# Patient Record
Sex: MALE | Race: BLACK OR AFRICAN AMERICAN | NOT HISPANIC OR LATINO | Employment: FULL TIME | URBAN - METROPOLITAN AREA
[De-identification: names, ages, dates, MRNs, and addresses within clinical notes are randomized per-mention and may not be internally consistent; named-entity substitution may affect disease eponyms.]

---

## 2021-08-18 ENCOUNTER — HOSPITAL ENCOUNTER (EMERGENCY)
Facility: HOSPITAL | Age: 31
Discharge: HOME | End: 2021-08-18
Attending: STUDENT IN AN ORGANIZED HEALTH CARE EDUCATION/TRAINING PROGRAM
Payer: MEDICAID

## 2021-08-18 VITALS
BODY MASS INDEX: 24.89 KG/M2 | RESPIRATION RATE: 17 BRPM | WEIGHT: 187.81 LBS | OXYGEN SATURATION: 100 % | DIASTOLIC BLOOD PRESSURE: 82 MMHG | HEIGHT: 73 IN | SYSTOLIC BLOOD PRESSURE: 118 MMHG | TEMPERATURE: 98 F | HEART RATE: 97 BPM

## 2021-08-18 DIAGNOSIS — M54.9 BACK PAIN, UNSPECIFIED BACK LOCATION, UNSPECIFIED BACK PAIN LATERALITY, UNSPECIFIED CHRONICITY: ICD-10-CM
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
## 2021-08-18 PROCEDURE — 99284 EMERGENCY DEPT VISIT MOD MDM: CPT | Mod: ER

## 2021-08-18 RX ORDER — IBUPROFEN 800 MG/1
800 TABLET ORAL EVERY 6 HOURS PRN
Qty: 20 TABLET | Refills: 0 | OUTPATIENT
Start: 2021-08-18 | End: 2022-06-20

## 2021-08-18 RX ORDER — METHOCARBAMOL 500 MG/1
1000 TABLET, FILM COATED ORAL 3 TIMES DAILY
Qty: 30 TABLET | Refills: 0 | Status: SHIPPED | OUTPATIENT
Start: 2021-08-18 | End: 2021-08-23

## 2022-06-20 ENCOUNTER — HOSPITAL ENCOUNTER (EMERGENCY)
Facility: HOSPITAL | Age: 32
Discharge: HOME | End: 2022-06-20
Attending: STUDENT IN AN ORGANIZED HEALTH CARE EDUCATION/TRAINING PROGRAM
Payer: MEDICAID

## 2022-06-20 VITALS
OXYGEN SATURATION: 98 % | TEMPERATURE: 98 F | RESPIRATION RATE: 16 BRPM | SYSTOLIC BLOOD PRESSURE: 156 MMHG | BODY MASS INDEX: 25.41 KG/M2 | DIASTOLIC BLOOD PRESSURE: 66 MMHG | HEIGHT: 74 IN | HEART RATE: 79 BPM | WEIGHT: 198 LBS

## 2022-06-20 DIAGNOSIS — S92.001A CLOSED NONDISPLACED FRACTURE OF RIGHT CALCANEUS, UNSPECIFIED PORTION OF CALCANEUS, INITIAL ENCOUNTER: Primary | ICD-10-CM

## 2022-06-20 PROCEDURE — 96372 THER/PROPH/DIAG INJ SC/IM: CPT | Mod: 59 | Performed by: PHYSICIAN ASSISTANT

## 2022-06-20 PROCEDURE — 25000003 PHARM REV CODE 250: Performed by: PHYSICIAN ASSISTANT

## 2022-06-20 PROCEDURE — 63600175 PHARM REV CODE 636 W HCPCS: Performed by: PHYSICIAN ASSISTANT

## 2022-06-20 PROCEDURE — 29515 APPLICATION SHORT LEG SPLINT: CPT | Mod: RT

## 2022-06-20 PROCEDURE — 99285 EMERGENCY DEPT VISIT HI MDM: CPT | Mod: 25

## 2022-06-20 RX ORDER — IBUPROFEN 800 MG/1
800 TABLET ORAL EVERY 8 HOURS PRN
Qty: 30 TABLET | Refills: 0 | Status: SHIPPED | OUTPATIENT
Start: 2022-06-20 | End: 2022-07-28 | Stop reason: SDUPTHER

## 2022-06-20 RX ORDER — NAPROXEN 500 MG/1
500 TABLET ORAL
Status: COMPLETED | OUTPATIENT
Start: 2022-06-20 | End: 2022-06-20

## 2022-06-20 RX ORDER — HYDROMORPHONE HYDROCHLORIDE 2 MG/ML
1 INJECTION, SOLUTION INTRAMUSCULAR; INTRAVENOUS; SUBCUTANEOUS
Status: COMPLETED | OUTPATIENT
Start: 2022-06-20 | End: 2022-06-20

## 2022-06-20 RX ORDER — OXYCODONE AND ACETAMINOPHEN 5; 325 MG/1; MG/1
1 TABLET ORAL EVERY 4 HOURS PRN
Qty: 18 TABLET | Refills: 0 | Status: SHIPPED | OUTPATIENT
Start: 2022-06-20

## 2022-06-20 RX ADMIN — HYDROMORPHONE HYDROCHLORIDE 1 MG: 2 INJECTION INTRAMUSCULAR; INTRAVENOUS; SUBCUTANEOUS at 04:06

## 2022-06-20 RX ADMIN — NAPROXEN 500 MG: 500 TABLET ORAL at 04:06

## 2022-06-20 NOTE — Clinical Note
"Luigi MONTES"Joceline Rodriguez was seen and treated in our emergency department on 6/20/2022.  He may return to work on 06/27/2022.       If you have any questions or concerns, please don't hesitate to call.      Frank Arita PA-C"

## 2022-06-20 NOTE — ED PROVIDER NOTES
Encounter Date: 6/20/2022       History     Chief Complaint   Patient presents with    Ankle Pain     Right ankle pain with edema and deformity. Pt reports he jumped down the stairs.      Chief Complaint: Ankle pain  History of  Present Illness: History obtained from patient. This 31 y.o. male who has no known past medical history presents to the ED complaining of right ankle pain after jumping down stairs prior to arrival.  Patient has been unable to bear weight on the right lower extremity since the incident.  Denies numbness, tingling, weakness.  No prior treatment for symptoms.  Pain is 10/10.        Review of patient's allergies indicates:   Allergen Reactions    Tomato Rash     No past medical history on file.  No past surgical history on file.  No family history on file.  Social History     Tobacco Use    Smoking status: Unknown If Ever Smoked     Review of Systems   Constitutional: Negative for chills and fever.   HENT: Negative for congestion, rhinorrhea and sore throat.    Eyes: Negative for visual disturbance.   Respiratory: Negative for cough and shortness of breath.    Cardiovascular: Negative for chest pain.   Gastrointestinal: Negative for abdominal pain, diarrhea, nausea and vomiting.   Genitourinary: Negative for dysuria, frequency and hematuria.   Musculoskeletal: Positive for arthralgias. Negative for back pain.   Skin: Negative for rash.   Neurological: Negative for dizziness, weakness, numbness and headaches.       Physical Exam     Initial Vitals [06/20/22 0140]   BP Pulse Resp Temp SpO2   (!) 156/66 79 16 98 °F (36.7 °C) 98 %      MAP       --         Physical Exam    Nursing note and vitals reviewed.  Constitutional: He appears well-developed and well-nourished. No distress.   HENT:   Head: Normocephalic and atraumatic.   Right Ear: Tympanic membrane normal.   Left Ear: Tympanic membrane normal.   Nose: Nose normal.   Mouth/Throat: Uvula is midline, oropharynx is clear and moist and mucous  membranes are normal.   Eyes: EOM are normal. Pupils are equal, round, and reactive to light.   Neck: Trachea normal and phonation normal. Neck supple. No stridor present.   Normal range of motion.   Full passive range of motion without pain.     Cardiovascular: Normal rate, regular rhythm and normal heart sounds. Exam reveals no gallop and no friction rub.    No murmur heard.  Pulmonary/Chest: Effort normal and breath sounds normal. No respiratory distress. He has no wheezes. He has no rhonchi. He has no rales.   Abdominal: Abdomen is soft. Bowel sounds are normal. He exhibits no mass. There is no abdominal tenderness. There is no rebound and no guarding.   Musculoskeletal:      Cervical back: Full passive range of motion without pain, normal range of motion and neck supple. No rigidity. No spinous process tenderness or muscular tenderness. Normal range of motion.      Right ankle: Swelling present. No deformity, ecchymosis or lacerations. Tenderness present over the lateral malleolus. No medial malleolus or proximal fibula tenderness. Decreased range of motion.      Right Achilles Tendon: Normal. No tenderness or defects. Pickett's test negative.     Neurological: He is alert and oriented to person, place, and time. He has normal strength. No cranial nerve deficit or sensory deficit.   Skin: Skin is warm and dry. Capillary refill takes less than 2 seconds.   Psychiatric: He has a normal mood and affect.         ED Course   Splint Application    Date/Time: 6/20/2022 5:37 AM  Performed by: Frank Arita PA-C  Authorized by: Lor Rios DO   Location details: right ankle  Splint type: short leg (with stirrups)  Supplies used: Ortho-Glass  Post-procedure: The splinted body part was neurovascularly unchanged following the procedure.  Patient tolerance: Patient tolerated the procedure well with no immediate complications        Labs Reviewed - No data to display       Imaging Results          CT Ankle  (Including Hindfoot) Without Contrast Right (In process)                 X-Ray Ankle Complete Right (Final result)  Result time 06/20/22 02:24:32    Final result by Maximo Sosa MD (06/20/22 02:24:32)                 Impression:      No acute fracture or dislocation of the ankle.  Soft tissue swelling over the lateral malleolus.    Comminuted nondisplaced calcaneal fracture with subtalar extension resulting in flattening of Boehler's angle.    This report was flagged in Epic as abnormal.      Electronically signed by: Maximo Sosa MD  Date:    06/20/2022  Time:    02:24             Narrative:    EXAMINATION:  XR ANKLE COMPLETE 3 VIEW RIGHT    CLINICAL HISTORY:  Edema, unspecified    TECHNIQUE:  AP, lateral, and oblique images of the right ankle were performed.    COMPARISON:  None    FINDINGS:  No acute fracture or dislocation of the ankle.  Comminuted nondisplaced calcaneal fracture with subtalar extension resulting in flattening of Boehler's angle.  Ankle mortise is symmetric.  Talar dome is maintained.  Soft tissue swelling over the lateral ankle.                                 Medications   naproxen tablet 500 mg (500 mg Oral Given 6/20/22 0402)   HYDROmorphone (PF) injection 1 mg (1 mg Intramuscular Given 6/20/22 0402)     Medical Decision Making:   ED Management:  This is an evaluation of a 31 y.o. male who presents to the ED for right ankle pain status post fall after jump from stairs.  Vital signs are stable.   Afebrile.  Patient is nontoxic appearing and in no acute distress.  There is tenderness palpation to the lateral malleolus of the right ankle.  There is significant edema to the ankle.  No open wounds.  No obvious deformities.  Neurovascularly intact.  All compartments are soft.    X-ray of the right ankle shows comminuted nondisplaced calcaneal fracture with subtalar extension resulting in flattening of Boehler's angle.    Given abnormal x-ray, Orthopedics was consulted.  I discussed case  with Dr. Shavonne Mondragon recommended CT to further evaluate the fracture.  She stated the patient would be stable for discharge home.  She recommended posterior ankle splint with stirrups.    Patient given return precautions and instructed to return to the emergency department for any new or worsening symptoms. Patient verbalized understanding and agreed with plan. Encouraged follow-up with PCP.                        Clinical Impression:   Final diagnoses:  [S92.001A] Closed nondisplaced fracture of right calcaneus, unspecified portion of calcaneus, initial encounter (Primary)          ED Disposition Condition    Discharge Stable        ED Prescriptions     Medication Sig Dispense Start Date End Date Auth. Provider    oxyCODONE-acetaminophen (PERCOCET) 5-325 mg per tablet Take 1 tablet by mouth every 4 (four) hours as needed for Pain. 18 tablet 6/20/2022  Frank Arita PA-C    ibuprofen (ADVIL,MOTRIN) 800 MG tablet Take 1 tablet (800 mg total) by mouth every 8 (eight) hours as needed for Pain. 30 tablet 6/20/2022  Frank Arita PA-C        Follow-up Information     Follow up With Specialties Details Why Contact Info    Shavonne Mondragon MD Orthopedic Surgery   [address]  [address]  [facility]  [facility] [address]  [phone]      [facility] [place] Emergency Medicine Go in 1 day If symptoms worsen [address] [facility] [id number] [phone]           Frank Arita PA-C  06/20/22 0517       Frank Arita PA-C  06/20/22 0537

## 2022-07-08 ENCOUNTER — TELEPHONE (OUTPATIENT)
Dept: ORTHOPEDICS | Facility: CLINIC | Age: 32
End: 2022-07-08
Payer: MEDICAID

## 2022-07-08 NOTE — TELEPHONE ENCOUNTER
----- Message from Carmen Castro sent at 7/8/2022  9:29 AM CDT -----  Regarding: Appt  Contact: Cyndi @ [phone]  CallerCyndi (Mom) is calling to speak to someone to at the [place] location to schedule pt for an appt soon. Pt has a referral in Epic. No appts in Epic, Caller is asking to have pt scheduled at the [place] location. Pt's dx: Heel pain, unspecified laterality [M79.673], Right foot pain [M79.671. Please call Cyndi @ [phone] or pt [phone].

## 2022-07-08 NOTE — TELEPHONE ENCOUNTER
Returned call to inform that no one is available at [place] to treat pt. Pt prefers [facility] closer to home. [facility] ED f/u note reads to f/u with Dr Mondragon. Address and phone number given to Dr Shavonne Mondragon's office.

## 2022-07-22 ENCOUNTER — OFFICE VISIT (OUTPATIENT)
Dept: PODIATRY | Facility: CLINIC | Age: 32
End: 2022-07-22
Payer: MEDICAID

## 2022-07-22 VITALS — BODY MASS INDEX: 24.73 KG/M2 | HEIGHT: 74 IN | WEIGHT: 192.69 LBS

## 2022-07-22 DIAGNOSIS — S92.001A CLOSED NONDISPLACED FRACTURE OF RIGHT CALCANEUS, UNSPECIFIED PORTION OF CALCANEUS, INITIAL ENCOUNTER: Primary | ICD-10-CM

## 2022-07-22 DIAGNOSIS — M79.671 PAIN OF RIGHT HEEL: ICD-10-CM

## 2022-07-22 PROCEDURE — 1160F RVW MEDS BY RX/DR IN RCRD: CPT | Mod: CPTII,,, | Performed by: PODIATRIST

## 2022-07-22 PROCEDURE — 99999 PR PBB SHADOW E&M-EST. PATIENT-LVL III: CPT | Mod: PBBFAC,,, | Performed by: PODIATRIST

## 2022-07-22 PROCEDURE — 99204 PR OFFICE/OUTPT VISIT, NEW, LEVL IV, 45-59 MIN: ICD-10-PCS | Mod: S$PBB,,, | Performed by: PODIATRIST

## 2022-07-22 PROCEDURE — 1159F MED LIST DOCD IN RCRD: CPT | Mod: CPTII,,, | Performed by: PODIATRIST

## 2022-07-22 PROCEDURE — 3008F BODY MASS INDEX DOCD: CPT | Mod: CPTII,,, | Performed by: PODIATRIST

## 2022-07-22 PROCEDURE — 1159F PR MEDICATION LIST DOCUMENTED IN MEDICAL RECORD: ICD-10-PCS | Mod: CPTII,,, | Performed by: PODIATRIST

## 2022-07-22 PROCEDURE — 3008F PR BODY MASS INDEX (BMI) DOCUMENTED: ICD-10-PCS | Mod: CPTII,,, | Performed by: PODIATRIST

## 2022-07-22 PROCEDURE — 99999 PR PBB SHADOW E&M-EST. PATIENT-LVL III: ICD-10-PCS | Mod: PBBFAC,,, | Performed by: PODIATRIST

## 2022-07-22 PROCEDURE — 1160F PR REVIEW ALL MEDS BY PRESCRIBER/CLIN PHARMACIST DOCUMENTED: ICD-10-PCS | Mod: CPTII,,, | Performed by: PODIATRIST

## 2022-07-22 PROCEDURE — 99204 OFFICE O/P NEW MOD 45 MIN: CPT | Mod: S$PBB,,, | Performed by: PODIATRIST

## 2022-07-22 PROCEDURE — 99213 OFFICE O/P EST LOW 20 MIN: CPT | Mod: PBBFAC,PN | Performed by: PODIATRIST

## 2022-07-22 NOTE — PROGRESS NOTES
Subjective:      Patient ID: Luigi Rodriguez is a 31 y.o. male.    Chief Complaint: Foot Injury (Patient presents today as  a new patient complaining he fractured his heel about 1 month ago. )      31 y.o. male presenting with right heel pain. Points hindfoot/calc area for pain. Aching and throbbing pain. Injury was on 6/20. Went to ED. Xrays and CT noted non/minimally displaced calc fracture. Was splinted and discharged from ED. Pain and swelling slowly improving. With his family member today. Has been walking on PS. Not presenting with crutches today. Denies smoking cigarettes.    Review of Systems   Constitutional: Negative for chills, decreased appetite, fever and malaise/fatigue.   HENT: Negative for congestion, ear discharge and sore throat.    Eyes: Negative for discharge and pain.   Cardiovascular: Negative for chest pain, claudication and leg swelling.   Respiratory: Negative for cough and shortness of breath.    Skin: Negative for color change, nail changes and rash.   Musculoskeletal: Positive for joint swelling and stiffness. Negative for arthritis, joint pain and muscle weakness.        R foot pain    Gastrointestinal: Negative for bloating, abdominal pain, diarrhea, nausea and vomiting.   Genitourinary: Negative for flank pain and hematuria.   Neurological: Negative for headaches, numbness and weakness.   Psychiatric/Behavioral: Negative for altered mental status.             No past medical history on file.    No past surgical history on file.    No family history on file.    Social History     Socioeconomic History    Marital status: Single   Tobacco Use    Smoking status: Former Smoker    Smokeless tobacco: Never Used       Current Outpatient Medications   Medication Sig Dispense Refill    cyclobenzaprine (FLEXERIL) 10 MG tablet Take 1 tablet (10 mg total) by mouth every evening. 30 tablet 0    etodolac (LODINE) 400 MG tablet Take 1 tablet (400 mg total) by mouth 2 (two) times daily. 30 tablet 0  "   ibuprofen (ADVIL,MOTRIN) 800 MG tablet Take 1 tablet (800 mg total) by mouth every 8 (eight) hours as needed for Pain. 30 tablet 0    oxyCODONE-acetaminophen (PERCOCET) 5-325 mg per tablet Take 1 tablet by mouth every 4 (four) hours as needed for Pain. 18 tablet 0    traMADoL (ULTRAM) 50 mg tablet Take 1 tablet (50 mg total) by mouth every 12 (twelve) hours as needed for Pain. 30 tablet 0    traMADoL (ULTRAM) 50 mg tablet Take 1 tablet (50 mg total) by mouth every 12 (twelve) hours as needed for Pain. 30 tablet 0     No current facility-administered medications for this visit.       Review of patient's allergies indicates:   Allergen Reactions    Tomato Rash       Vitals:    07/22/22 0815   Weight: 87.4 kg (192 lb 11.2 oz)   Height: 6' 2" (1.88 m)   PainSc: 0-No pain       Objective:      Physical Exam  Constitutional:       General: He is not in acute distress.     Appearance: He is well-developed.   HENT:      Nose: Nose normal.   Eyes:      Conjunctiva/sclera: Conjunctivae normal.   Pulmonary:      Effort: Pulmonary effort is normal.   Chest:      Chest wall: No tenderness.   Abdominal:      Tenderness: There is no abdominal tenderness.   Musculoskeletal:      Cervical back: Normal range of motion.   Neurological:      Mental Status: He is alert and oriented to person, place, and time.   Psychiatric:         Behavior: Behavior normal.         Vascular: Distal DP/PT pulses palpable 2/4. CRT < 3 sec to tips of toes. No vericosities noted to LEs. Hair growth present LE, warm to touch LE,  RLE: mild edema noted to hindfoot.     Dermatologic: No open lesions, lacerations or wounds. Interdigital spaces clean, dry and intact. No erythema, rubor, calor noted LE    Musculoskeletal: MMT 5/5 in DF/PF/Inv/Ev resistance. Passive range of motion of ankle and pedal joints is painless. No calf tenderness LE, Compartments soft/compressible.   RLE: ttp medial and lateral aspect of calc. Pain over STJ. No ankle pain. No " midfoot pain. No pain at lisfranc.     Neurological: Light touch, proprioception, and sharp/dull sensation are all intact. Protective threshold with the [place]-Wienstein monofilament is intact. Vibratory sensation intact.         Assessment:       Encounter Diagnoses   Name Primary?    Closed nondisplaced fracture of right calcaneus, unspecified portion of calcaneus, initial encounter Yes    Pain of right heel          Plan:       Luigi MONTES was seen today for foot injury.    Diagnoses and all orders for this visit:    Closed nondisplaced fracture of right calcaneus, unspecified portion of calcaneus, initial encounter    Pain of right heel      I counseled the patient on his conditions, their implications and medical management.    31 y.o. male with R heel fracture. minimally displaced. Sinclair type3.    -R foot xrays and CT reviewed with patient. Minimally displaced fracture. Minimal step off along posterior facet of STJ. Minimal lateral wall blown out. Heel not in varus. No Posterior facet depression noted. Based on radiographic findings and the fact that this injury was a month ago, I decided to treat this non surgically. Recommend NWB until 8/8 and WBAT in CAM after 8/8. RICE. Long CAM dispensed.      -I reviewed imaging, clinical history, and diagnosis as above with the patient. I attempted to use layman's terms to educate the patient as well as utilize foot models and/or pictures. I personally went through imaging with the patient.    -The nature of the condition, options for management, as well as potential risks and complications were discussed in detail with patient. Patient was amenable to my recommendations and left my office fully informed and will follow up as instructed or sooner if necessary.    -f/u 4 wks     Note dictated with voice recognition software, please excuse any grammatical errors.

## 2022-08-22 ENCOUNTER — OFFICE VISIT (OUTPATIENT)
Dept: PODIATRY | Facility: CLINIC | Age: 32
End: 2022-08-22
Payer: MEDICAID

## 2022-08-22 VITALS — HEIGHT: 74 IN | BODY MASS INDEX: 23.87 KG/M2 | WEIGHT: 186 LBS

## 2022-08-22 DIAGNOSIS — M79.671 RIGHT FOOT PAIN: ICD-10-CM

## 2022-08-22 DIAGNOSIS — S92.001D CLOSED NONDISPLACED FRACTURE OF RIGHT CALCANEUS WITH ROUTINE HEALING, UNSPECIFIED PORTION OF CALCANEUS, SUBSEQUENT ENCOUNTER: Primary | ICD-10-CM

## 2022-08-22 PROCEDURE — 3008F BODY MASS INDEX DOCD: CPT | Mod: CPTII,,, | Performed by: PODIATRIST

## 2022-08-22 PROCEDURE — 99999 PR PBB SHADOW E&M-EST. PATIENT-LVL III: CPT | Mod: PBBFAC,,, | Performed by: PODIATRIST

## 2022-08-22 PROCEDURE — 1160F RVW MEDS BY RX/DR IN RCRD: CPT | Mod: CPTII,,, | Performed by: PODIATRIST

## 2022-08-22 PROCEDURE — 99213 OFFICE O/P EST LOW 20 MIN: CPT | Mod: S$PBB,,, | Performed by: PODIATRIST

## 2022-08-22 PROCEDURE — 99213 PR OFFICE/OUTPT VISIT, EST, LEVL III, 20-29 MIN: ICD-10-PCS | Mod: S$PBB,,, | Performed by: PODIATRIST

## 2022-08-22 PROCEDURE — 1159F PR MEDICATION LIST DOCUMENTED IN MEDICAL RECORD: ICD-10-PCS | Mod: CPTII,,, | Performed by: PODIATRIST

## 2022-08-22 PROCEDURE — 99999 PR PBB SHADOW E&M-EST. PATIENT-LVL III: ICD-10-PCS | Mod: PBBFAC,,, | Performed by: PODIATRIST

## 2022-08-22 PROCEDURE — 1159F MED LIST DOCD IN RCRD: CPT | Mod: CPTII,,, | Performed by: PODIATRIST

## 2022-08-22 PROCEDURE — 99213 OFFICE O/P EST LOW 20 MIN: CPT | Mod: PBBFAC,PN | Performed by: PODIATRIST

## 2022-08-22 PROCEDURE — 1160F PR REVIEW ALL MEDS BY PRESCRIBER/CLIN PHARMACIST DOCUMENTED: ICD-10-PCS | Mod: CPTII,,, | Performed by: PODIATRIST

## 2022-08-22 PROCEDURE — 3008F PR BODY MASS INDEX (BMI) DOCUMENTED: ICD-10-PCS | Mod: CPTII,,, | Performed by: PODIATRIST

## 2022-08-22 NOTE — PROGRESS NOTES
Subjective:      Patient ID: Luigi Rodriguez is a 31 y.o. male.    Chief Complaint: Follow-up (Patient presents today as a follow up from last visit for right foot. )      31 y.o. male presenting with right heel pain. Points hindfoot/calc area for pain. Aching and throbbing pain. Injury was on 6/20. Went to ED. Xrays and CT noted non/minimally displaced calc fracture. Was splinted and discharged from ED. Pain and swelling slowly improving. With his family member today. Has been walking on PS. Not presenting with crutches today. Denies smoking cigarettes.    8/22/22 R calc fx. Ambulating in open toe shoes today. Recommendation was to ambulate in CAM walker. Tells me pain and swelling improving. Presenting with compression stocking as well.     Review of Systems   Constitutional: Negative for chills, decreased appetite, fever and malaise/fatigue.   HENT: Negative for congestion, ear discharge and sore throat.    Eyes: Negative for discharge and pain.   Cardiovascular: Negative for chest pain, claudication and leg swelling.   Respiratory: Negative for cough and shortness of breath.    Skin: Negative for color change, nail changes and rash.   Musculoskeletal: Positive for joint swelling and stiffness. Negative for arthritis, joint pain and muscle weakness.        R foot pain    Gastrointestinal: Negative for bloating, abdominal pain, diarrhea, nausea and vomiting.   Genitourinary: Negative for flank pain and hematuria.   Neurological: Negative for headaches, numbness and weakness.   Psychiatric/Behavioral: Negative for altered mental status.             No past medical history on file.    No past surgical history on file.    No family history on file.    Social History     Socioeconomic History    Marital status: Single   Tobacco Use    Smoking status: Former Smoker    Smokeless tobacco: Never Used       Current Outpatient Medications   Medication Sig Dispense Refill    cyclobenzaprine (FLEXERIL) 10 MG tablet Take 1  "tablet (10 mg total) by mouth every evening. 30 tablet 0    etodolac (LODINE) 400 MG tablet Take 1 tablet (400 mg total) by mouth 2 (two) times daily. 30 tablet 0    ibuprofen (ADVIL,MOTRIN) 800 MG tablet Take 1 tablet (800 mg total) by mouth every 8 (eight) hours as needed for Pain. 30 tablet 0    oxyCODONE-acetaminophen (PERCOCET) 5-325 mg per tablet Take 1 tablet by mouth every 4 (four) hours as needed for Pain. 18 tablet 0    traMADoL (ULTRAM) 50 mg tablet Take 1 tablet (50 mg total) by mouth every 12 (twelve) hours as needed for Pain. 30 tablet 0    traMADoL (ULTRAM) 50 mg tablet Take 1 tablet (50 mg total) by mouth every 12 (twelve) hours as needed for Pain. 30 tablet 0     No current facility-administered medications for this visit.       Review of patient's allergies indicates:   Allergen Reactions    Tomato Rash       Vitals:    08/22/22 0937   Weight: 84.4 kg (186 lb)   Height: 6' 2" (1.88 m)   PainSc: 0-No pain       Objective:      Physical Exam  Constitutional:       General: He is not in acute distress.     Appearance: He is well-developed.   HENT:      Nose: Nose normal.   Eyes:      Conjunctiva/sclera: Conjunctivae normal.   Pulmonary:      Effort: Pulmonary effort is normal.   Chest:      Chest wall: No tenderness.   Abdominal:      Tenderness: There is no abdominal tenderness.   Musculoskeletal:      Cervical back: Normal range of motion.   Neurological:      Mental Status: He is alert and oriented to person, place, and time.   Psychiatric:         Behavior: Behavior normal.         Vascular: Distal DP/PT pulses palpable 2/4. CRT < 3 sec to tips of toes. No vericosities noted to LEs. Hair growth present LE, warm to touch LE,  RLE: no edema noted to hindfoot.     Dermatologic: No open lesions, lacerations or wounds. Interdigital spaces clean, dry and intact. No erythema, rubor, calor noted LE    Musculoskeletal: MMT 5/5 in DF/PF/Inv/Ev resistance. Passive range of motion of ankle and pedal " joints is painless. No calf tenderness LE, Compartments soft/compressible.   RLE: mild tenderness medial and lateral aspect of calc. Pain over STJ. No ankle pain. No midfoot pain. No pain at lisfranc.     Neurological: Light touch, proprioception, and sharp/dull sensation are all intact. Protective threshold with the [place]-Wienstein monofilament is intact. Vibratory sensation intact.         Assessment:       Encounter Diagnoses   Name Primary?    Closed nondisplaced fracture of right calcaneus with routine healing, unspecified portion of calcaneus, subsequent encounter Yes    Right foot pain          Plan:       Luigi MONTES was seen today for follow-up.    Diagnoses and all orders for this visit:    Closed nondisplaced fracture of right calcaneus with routine healing, unspecified portion of calcaneus, subsequent encounter    Right foot pain      I counseled the patient on his conditions, their implications and medical management.    31 y.o. male with R heel fracture. minimally displaced. Sinclair type3.    -R foot pain slowly improving. Has been walking without protection. Presenting to office without CAM today. Swelling improved. No WB restriction from pod.     -I reviewed imaging, clinical history, and diagnosis as above with the patient. I attempted to use layman's terms to educate the patient as well as utilize foot models and/or pictures. I personally went through imaging with the patient.    -The nature of the condition, options for management, as well as potential risks and complications were discussed in detail with patient. Patient was amenable to my recommendations and left my office fully informed and will follow up as instructed or sooner if necessary.    -It was discussed the importance of wearing shoes with adequate room in toe box to accommodate toe deformities. Recommended New Balance/[employer] shoe brands with adequate arch supports to alleviate abnormal pressure and improve stability of foot while  walking. Avoid flat shoes and barefoot walking as these will exacerbate or worsen symptoms.   -f/u 4 weeks     Note dictated with voice recognition software, please excuse any grammatical errors.